# Patient Record
Sex: MALE | Race: WHITE | NOT HISPANIC OR LATINO | Employment: OTHER | URBAN - METROPOLITAN AREA
[De-identification: names, ages, dates, MRNs, and addresses within clinical notes are randomized per-mention and may not be internally consistent; named-entity substitution may affect disease eponyms.]

---

## 2018-01-18 ENCOUNTER — TELEPHONE (OUTPATIENT)
Dept: GASTROENTEROLOGY | Facility: CLINIC | Age: 43
End: 2018-01-18

## 2018-01-18 NOTE — TELEPHONE ENCOUNTER
Message   Received: Today   Message Contents   MD Sonya Stratton MA   Caller: Unspecified (Today,  3:30 PM)             Clinic appointment   R

## 2018-01-23 ENCOUNTER — TELEPHONE (OUTPATIENT)
Dept: GASTROENTEROLOGY | Facility: CLINIC | Age: 43
End: 2018-01-23

## 2018-01-24 ENCOUNTER — TELEPHONE (OUTPATIENT)
Dept: GASTROENTEROLOGY | Facility: CLINIC | Age: 43
End: 2018-01-24

## 2018-01-24 NOTE — TELEPHONE ENCOUNTER
----- Message from Eneida Barker sent at 1/24/2018  8:46 AM CST -----  Contact: self - [phone]  speedy - returning your call - please call patient at

## 2018-02-01 ENCOUNTER — OFFICE VISIT (OUTPATIENT)
Dept: GASTROENTEROLOGY | Facility: CLINIC | Age: 43
End: 2018-02-01
Payer: MEDICARE

## 2018-02-01 VITALS
HEART RATE: 88 BPM | SYSTOLIC BLOOD PRESSURE: 139 MMHG | BODY MASS INDEX: 19.6 KG/M2 | HEIGHT: 71 IN | DIASTOLIC BLOOD PRESSURE: 89 MMHG | WEIGHT: 140 LBS

## 2018-02-01 DIAGNOSIS — K22.70 BARRETT'S ESOPHAGUS WITHOUT DYSPLASIA: ICD-10-CM

## 2018-02-01 PROCEDURE — 99999 PR PBB SHADOW E&M-EST. PATIENT-LVL III: CPT | Mod: PBBFAC,,, | Performed by: INTERNAL MEDICINE

## 2018-02-01 PROCEDURE — 99202 OFFICE O/P NEW SF 15 MIN: CPT | Mod: S$PBB,,, | Performed by: INTERNAL MEDICINE

## 2018-02-01 PROCEDURE — 99213 OFFICE O/P EST LOW 20 MIN: CPT | Mod: PBBFAC | Performed by: INTERNAL MEDICINE

## 2018-02-01 RX ORDER — NITROFURANTOIN (MACROCRYSTALS) 100 MG/1
CAPSULE ORAL
Refills: 0 | COMMUNITY
Start: 2017-12-12

## 2018-02-01 RX ORDER — DILTIAZEM HYDROCHLORIDE 120 MG/1
120 CAPSULE, EXTENDED RELEASE ORAL DAILY
Refills: 5 | COMMUNITY
Start: 2018-01-22

## 2018-02-01 RX ORDER — DILTIAZEM HYDROCHLORIDE 120 MG/1
120 CAPSULE, EXTENDED RELEASE ORAL
COMMUNITY
Start: 2017-10-19

## 2018-02-01 RX ORDER — LISINOPRIL 10 MG/1
10 TABLET ORAL
COMMUNITY
Start: 2018-01-04 | End: 2018-02-03

## 2018-02-01 RX ORDER — METHENAMINE HIPPURATE 1000 MG/1
1 TABLET ORAL 4 TIMES DAILY
Refills: 11 | COMMUNITY
Start: 2018-01-16

## 2018-02-01 RX ORDER — DOXYCYCLINE HYCLATE 100 MG
TABLET ORAL
Refills: 0 | COMMUNITY
Start: 2017-12-18

## 2018-02-01 RX ORDER — METHENAMINE MANDELATE 1000 MG/1
1 TABLET, FILM COATED ORAL
COMMUNITY
Start: 2017-12-18 | End: 2018-12-18

## 2018-02-01 RX ORDER — PANTOPRAZOLE SODIUM 40 MG/1
40 TABLET, DELAYED RELEASE ORAL
COMMUNITY
Start: 2018-01-09

## 2018-02-01 NOTE — PROGRESS NOTES
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 The patient is 42 year old gentlemen with symptoms of heartburn and dysphagia S/P EGD with Dr Zach Lerma which demonstrated evidence of a 7 cm hiatal hernia and long segment Hernandez's esophagus (34 cm to 24 cm). Biopsies were obtained every 2 cm in four quadrants confirming Hernandez's esophagus without dysplasia. The patient is currently of Protonix 40 mg orally BID with good response. Denied dysphagia or heartburn. He have a recent esophageal manometry study which was suspicious for a dysmotility disorder.      Impression:    Hiatal Hernia  May benefit from Surgical opinion    GERD      Hernandez's esophagus without dysplasia    I discussed Hernandez's esophagus with the patient and family. We discussed surveillance protocol vs. Therapy (ablation). I did explained that we usually proceed with ablation therapy in patients with dysplasia which is the current recommendations. Ablation therapy in non dysplastic Hernandez's is usually performed in patient with strong family history of esophageal cancer and in patient were the condition may be creating significant psychological burden. Given his young age I may not be able to completely extrapolate what is known of the natural history (more common in older patients). Ablation therapy was explained with risk, benefits and results.       Recommendations:    1) Continue Protonix 40 mg PO BID  2) May benefit from Surgical opinion for the hiatal hernia after dysmotility disorder clarified.  3) Repeat EGD with biopsies of the Hernandez's mucosa 4 quadrants every 1 cm and target biopsies of any raise area. If dysplasia identified, then we will proceed with ablation therapy. If no dysplasia I will advice surveillance protocol every 2-3 years, unless the patient want to proceed with ablation therapy despite no dysplasia.

## 2018-02-01 NOTE — LETTER
February 1, 2018      Zach Lerma MD  [address]  [facility] [place] [address]           [facility]  [address]  [place] [address]  Phone: [phone]  Fax: [phone]          Patient: Rao Silver   MR Number: [id number]   YOB: 1975   Date of Visit: 2/1/2018       Dear Dr. Zach Lerma:    Thank you for referring Rao Silver to me for evaluation. Attached you will find relevant portions of my assessment and plan of care.    If you have questions, please do not hesitate to call me. I look forward to following Rao Silver along with you.    Sincerely,    Brad Burk MD    Enclosure  CC:  No Recipients    If you would like to receive this communication electronically, please contact [email or web address] or [phone] to request more information on [employer] access.    For providers and/or their staff who would like to refer a patient to [facility], please contact us through our one-stop-shop provider referral line, [facility], at [phone].    If you feel you have received this communication in error or would no longer like to receive these types of communications, please e-mail [email or web address]

## 2018-02-02 ENCOUNTER — TELEPHONE (OUTPATIENT)
Dept: GASTROENTEROLOGY | Facility: CLINIC | Age: 43
End: 2018-02-02

## 2018-02-02 NOTE — TELEPHONE ENCOUNTER
Please let the patient know I spoke with Dr Lerma and he will perform the EGD with 1 cm- 4 quadrants biopsies for Hernandez's. Please make sure he have a follow up with Dr Lerma for the procedure.

## 2018-05-08 ENCOUNTER — TELEPHONE (OUTPATIENT)
Dept: GASTROENTEROLOGY | Facility: CLINIC | Age: 43
End: 2018-05-08

## 2018-05-08 NOTE — TELEPHONE ENCOUNTER
----- Message from Bruce Grijalva sent at 5/8/2018 11:54 AM CDT -----  Contact: Pt   PT missed a call and would like nurse Evelyn  to return his call   Pt can be reached at [phone]

## 2018-05-08 NOTE — TELEPHONE ENCOUNTER
Attempted to contact patient without success.    Left message for patient to return call to the clinic to set up new patient visit with Dr. Bridges.

## 2018-05-08 NOTE — TELEPHONE ENCOUNTER
Spoke with patient and patient declined appointment with Dr. Bridges for now.    Patient will call referring doctor and see if he should see Dr. Bridges and give us a call.    Records sent to scanning 5/8/18